# Patient Record
Sex: MALE | Race: BLACK OR AFRICAN AMERICAN | NOT HISPANIC OR LATINO
[De-identification: names, ages, dates, MRNs, and addresses within clinical notes are randomized per-mention and may not be internally consistent; named-entity substitution may affect disease eponyms.]

---

## 2020-08-19 PROBLEM — Z00.00 ENCOUNTER FOR PREVENTIVE HEALTH EXAMINATION: Status: ACTIVE | Noted: 2020-08-19

## 2020-08-27 ENCOUNTER — TRANSCRIPTION ENCOUNTER (OUTPATIENT)
Age: 60
End: 2020-08-27

## 2020-08-27 ENCOUNTER — APPOINTMENT (OUTPATIENT)
Dept: UROLOGY | Facility: CLINIC | Age: 60
End: 2020-08-27
Payer: COMMERCIAL

## 2020-08-27 VITALS
SYSTOLIC BLOOD PRESSURE: 197 MMHG | DIASTOLIC BLOOD PRESSURE: 90 MMHG | RESPIRATION RATE: 17 BRPM | TEMPERATURE: 98.8 F | HEART RATE: 92 BPM

## 2020-08-27 DIAGNOSIS — Z78.9 OTHER SPECIFIED HEALTH STATUS: ICD-10-CM

## 2020-08-27 DIAGNOSIS — Z12.5 ENCOUNTER FOR SCREENING FOR MALIGNANT NEOPLASM OF PROSTATE: ICD-10-CM

## 2020-08-27 DIAGNOSIS — Z87.891 PERSONAL HISTORY OF NICOTINE DEPENDENCE: ICD-10-CM

## 2020-08-27 DIAGNOSIS — N48.6 INDURATION PENIS PLASTICA: ICD-10-CM

## 2020-08-27 LAB
BILIRUB UR QL STRIP: NORMAL
CLARITY UR: CLEAR
GLUCOSE UR-MCNC: NORMAL
HCG UR QL: 0.2 EU/DL
HGB UR QL STRIP.AUTO: NORMAL
KETONES UR-MCNC: NORMAL
LEUKOCYTE ESTERASE UR QL STRIP: NORMAL
NITRITE UR QL STRIP: NORMAL
PH UR STRIP: 5.5
PROT UR STRIP-MCNC: NORMAL
SP GR UR STRIP: 1.02

## 2020-08-27 PROCEDURE — 99204 OFFICE O/P NEW MOD 45 MIN: CPT | Mod: 25

## 2020-08-27 PROCEDURE — 81003 URINALYSIS AUTO W/O SCOPE: CPT | Mod: NC,QW

## 2020-08-27 RX ORDER — CLONIDINE HYDROCHLORIDE 0.2 MG/1
0.2 TABLET ORAL
Refills: 0 | Status: ACTIVE | COMMUNITY

## 2020-08-27 RX ORDER — LABETALOL HYDROCHLORIDE 100 MG/1
100 TABLET, FILM COATED ORAL
Refills: 0 | Status: ACTIVE | COMMUNITY

## 2020-08-27 NOTE — HISTORY OF PRESENT ILLNESS
[FreeTextEntry1] : 61 yo c/o curving of erection\par notes a 'gap' between shaft and scrotum\par also notes penis is shorter\par able to penetrate\par lowl libido\par \par no voiding c/o

## 2020-08-27 NOTE — PHYSICAL EXAM
[General Appearance - Well Developed] : well developed [General Appearance - Well Nourished] : well nourished [Normal Appearance] : normal appearance [Well Groomed] : well groomed [General Appearance - In No Acute Distress] : no acute distress [Edema] : no peripheral edema [Respiration, Rhythm And Depth] : normal respiratory rhythm and effort [Abdomen Tenderness] : non-tender [Abdomen Soft] : soft [Exaggerated Use Of Accessory Muscles For Inspiration] : no accessory muscle use [Costovertebral Angle Tenderness] : no ~M costovertebral angle tenderness [Urethral Meatus] : meatus normal [Urinary Bladder Findings] : the bladder was normal on palpation [Scrotum] : the scrotum was normal [Testes Mass (___cm)] : there were no testicular masses [No Prostate Nodules] : no prostate nodules [Normal Station and Gait] : the gait and station were normal for the patient's age [] : no rash [Oriented To Time, Place, And Person] : oriented to person, place, and time [No Focal Deficits] : no focal deficits [Affect] : the affect was normal [Mood] : the mood was normal [Not Anxious] : not anxious [No Palpable Adenopathy] : no palpable adenopathy [Dorsal] : dorsal aspect [Single Plaque ___cm] : a single [unfilled] ~Ucm [Proximal Shaft] : proximal shaft [Size (1+)] : size was 1+ [Prostate Hard Area Or Nodule Bilaterally] : had a palpable nodule [Nl Inspection] : the anus was normal on inspection. [Rectal Exam - Prostate] : was indurated [Prostate Tenderness] : was tender [Nl Perineum] : perineum was normal on inspection [Nl Sphincter Tone] : normal sphincter tone [Swelling] : no swelling [Circumcised] : the penis was circumcised [FreeTextEntry1] : dorsal

## 2020-08-27 NOTE — ASSESSMENT
[FreeTextEntry1] : Pt with dorsal penile plaque c/w Peyronie's\par Has minimal curvature and is able to penetrate\par reviewed options and advise no intervention at this time\par Testo and psa pending\par \par Also has 1+ proteinuria and elev BP\par Had A1C7.4 last year\par \par Emphasized itzel to see his pcp asap for f/u and treamtnet of DM a nd HTNin

## 2020-08-27 NOTE — LETTER BODY
[Dear  ___] : Dear  [unfilled], [Consult Letter:] : I had the pleasure of evaluating your patient, [unfilled]. [Please see my note below.] : Please see my note below. [Consult Closing:] : Thank you very much for allowing me to participate in the care of this patient.  If you have any questions, please do not hesitate to contact me. [Sincerely,] : Sincerely, [FreeTextEntry1] : see below\par pt advised to f/u asap for DM and HTN treatment\par has 1+ protein in urine [FreeTextEntry3] : Juni Adkins MD FACS\par \par Attending Urologist-[facility]\par Director - Strategic Operations Urology\par Assistant Clinical Professor-[facility]\par \par

## 2020-08-27 NOTE — REVIEW OF SYSTEMS
[Heartburn] : heartburn [Loss of interest] : loss of interest in sexual activity [Negative] : Heme/Lymph

## 2020-08-29 LAB
PSA SERPL-MCNC: 0.62 NG/ML
TESTOST SERPL-MCNC: 177 NG/DL

## 2020-09-11 ENCOUNTER — APPOINTMENT (OUTPATIENT)
Dept: UROLOGY | Facility: CLINIC | Age: 60
End: 2020-09-11
Payer: COMMERCIAL

## 2020-09-11 VITALS
RESPIRATION RATE: 17 BRPM | HEART RATE: 84 BPM | SYSTOLIC BLOOD PRESSURE: 208 MMHG | DIASTOLIC BLOOD PRESSURE: 101 MMHG | TEMPERATURE: 98.5 F

## 2020-09-11 VITALS — DIASTOLIC BLOOD PRESSURE: 120 MMHG | SYSTOLIC BLOOD PRESSURE: 198 MMHG

## 2020-09-11 DIAGNOSIS — E29.1 TESTICULAR HYPOFUNCTION: ICD-10-CM

## 2020-09-11 DIAGNOSIS — R68.82 DECREASED LIBIDO: ICD-10-CM

## 2020-09-11 PROCEDURE — 99215 OFFICE O/P EST HI 40 MIN: CPT

## 2020-09-11 NOTE — ASSESSMENT
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

## 2020-09-11 NOTE — LETTER BODY
[Dear  ___] : Dear  [unfilled], [Courtesy Letter:] : I had the pleasure of seeing your patient, [unfilled], in my office today. [Please see my note below.] : Please see my note below. [Sincerely,] : Sincerely, [FreeTextEntry1] : Pt advised to contact you asap.  uinable to reach your office by phone\carmelina leblanc possibly but AFTER resolution of HTN [FreeTextEntry3] : Juni Adkins MD FACS\par \par Attending Urologist-[facility]\par Director - Strategic Operations Urology\par Assistant Clinical Professor-[facility] at [facility]\par \par

## 2020-09-11 NOTE — HISTORY OF PRESENT ILLNESS
[FreeTextEntry1] : 60 year old male is here today for a follow up regarding Peyronie's disease, hypogonadism. \par \par Pt also c/o decreased libido. \par Sl low T(177)\par \par To obtain gonadotrphins\par \par

## 2020-09-11 NOTE — END OF VISIT
[FreeTextEntry3] : Medical record entries made by the scribe today, were at my direction and personally dictated to them by me, Dr. Juni Adkins on 09/11/2020. I have reviewed the chart and agree that the record accurately reflects my personal performance of the history, physical exam, assessment, and plan.

## 2020-09-13 LAB
FSH SERPL-MCNC: 4.6 IU/L
LH SERPL-ACNC: 8.2 IU/L
PROLACTIN SERPL-MCNC: 14 NG/ML
TESTOST SERPL-MCNC: 245 NG/DL